# Patient Record
Sex: MALE | Race: WHITE | URBAN - METROPOLITAN AREA
[De-identification: names, ages, dates, MRNs, and addresses within clinical notes are randomized per-mention and may not be internally consistent; named-entity substitution may affect disease eponyms.]

---

## 2017-03-21 ENCOUNTER — ANESTHESIA EVENT (OUTPATIENT)
Dept: SURGERY | Age: 27
End: 2017-03-21
Payer: SELF-PAY

## 2017-03-21 NOTE — H&P
Surgery History and Physical    Subjective:      Marek Gonzalez is a 31 y.o. male who presents for bilateral excision gynecomastia and liposuction of chest.     Past Medical History:   Diagnosis Date    Anxiety     Depression      Past Surgical History:   Procedure Laterality Date    HX ORTHOPAEDIC        Family History   Problem Relation Age of Onset    Migraines      Thyroid Disease      Depression       Social History   Substance Use Topics    Smoking status: Former Smoker    Smokeless tobacco: Current User    Alcohol use Yes      Prior to Admission medications    Medication Sig Start Date End Date Taking? Authorizing Provider   TOPIRAMATE (TOPAMAX PO) Take  by mouth. Historical Provider   PARoxetine (PAXIL) 10 mg tablet Take  by mouth daily. Historical Provider      No Known Allergies    Review of Systems:  Cardiovascular: negative  Gastrointestinal: negative  Integument/Breast: negative    Objective:      No data found. Temp (24hrs), Av °F (-17.8 °C), Min:, Max:      Physical Exam:  GENERAL: alert, cooperative, no distress, appears stated age  LUNG: clear to auscultation bilaterally  HEART: regular rate and rhythm, S1, S2 normal, no murmur, click, rub or gallop  ABDOMEN: soft, non-tender. Bowel sounds normal. No masses,  no organomegaly  NEUROLOGIC: negative    Assessment: This is a 32 yo male who presents for bilateral excision gynecomastia and liposuction of chest.     Plan:     Discussed the risk of surgery including bleeding and infection,  and the risks of general anesthetic. The patient understands the risks; any and all questions were answered to the patient's satisfaction.     Signed By: Tank Lloyd PA-C     2017

## 2017-03-22 ENCOUNTER — HOSPITAL ENCOUNTER (OUTPATIENT)
Age: 27
Setting detail: OUTPATIENT SURGERY
Discharge: HOME | End: 2017-03-22
Attending: SURGERY | Admitting: SURGERY
Payer: SELF-PAY

## 2017-03-22 ENCOUNTER — ANESTHESIA (OUTPATIENT)
Dept: SURGERY | Age: 27
End: 2017-03-22
Payer: SELF-PAY

## 2017-03-22 VITALS
WEIGHT: 211.42 LBS | BODY MASS INDEX: 29.6 KG/M2 | SYSTOLIC BLOOD PRESSURE: 149 MMHG | HEIGHT: 71 IN | TEMPERATURE: 97.8 F | OXYGEN SATURATION: 97 % | RESPIRATION RATE: 18 BRPM | DIASTOLIC BLOOD PRESSURE: 78 MMHG | HEART RATE: 77 BPM

## 2017-03-22 PROCEDURE — 77030008684 HC TU ET CUF COVD -B: Performed by: ANESTHESIOLOGY

## 2017-03-22 PROCEDURE — 77030002986 HC SUT PROL J&J -A: Performed by: SURGERY

## 2017-03-22 PROCEDURE — 77030002916 HC SUT ETHLN J&J -A: Performed by: SURGERY

## 2017-03-22 PROCEDURE — 77030002933 HC SUT MCRYL J&J -A: Performed by: SURGERY

## 2017-03-22 PROCEDURE — 77030010507 HC ADH SKN DERMBND J&J -B: Performed by: SURGERY

## 2017-03-22 PROCEDURE — 74011000272 HC RX REV CODE- 272: Performed by: SURGERY

## 2017-03-22 PROCEDURE — 76060000034 HC ANESTHESIA 1.5 TO 2 HR: Performed by: SURGERY

## 2017-03-22 PROCEDURE — 77030008467 HC STPLR SKN COVD -B: Performed by: SURGERY

## 2017-03-22 PROCEDURE — 77030002966 HC SUT PDS J&J -A: Performed by: SURGERY

## 2017-03-22 PROCEDURE — 77030008534 HC TBNG LIPOSUC BYRO -B: Performed by: SURGERY

## 2017-03-22 PROCEDURE — 77030020255 HC SOL INJ LR 1000ML BG: Performed by: SURGERY

## 2017-03-22 PROCEDURE — 74011250636 HC RX REV CODE- 250/636: Performed by: SURGERY

## 2017-03-22 PROCEDURE — 74011000250 HC RX REV CODE- 250: Performed by: SURGERY

## 2017-03-22 PROCEDURE — 77030008538 HC TBNG LIPO SUC WELL -B: Performed by: SURGERY

## 2017-03-22 PROCEDURE — 77030020782 HC GWN BAIR PAWS FLX 3M -B

## 2017-03-22 PROCEDURE — 77030002996 HC SUT SLK J&J -A: Performed by: SURGERY

## 2017-03-22 PROCEDURE — 76010000153 HC OR TIME 1.5 TO 2 HR: Performed by: SURGERY

## 2017-03-22 PROCEDURE — 77030032490 HC SLV COMPR SCD KNE COVD -B: Performed by: SURGERY

## 2017-03-22 PROCEDURE — 74011250636 HC RX REV CODE- 250/636: Performed by: ANESTHESIOLOGY

## 2017-03-22 PROCEDURE — 77030019908 HC STETH ESOPH SIMS -A: Performed by: ANESTHESIOLOGY

## 2017-03-22 PROCEDURE — 77030011640 HC PAD GRND REM COVD -A: Performed by: SURGERY

## 2017-03-22 PROCEDURE — 76210000016 HC OR PH I REC 1 TO 1.5 HR: Performed by: SURGERY

## 2017-03-22 PROCEDURE — 77030026438 HC STYL ET INTUB CARD -A: Performed by: ANESTHESIOLOGY

## 2017-03-22 PROCEDURE — 77030013567 HC DRN WND RESERV BARD -A: Performed by: SURGERY

## 2017-03-22 PROCEDURE — 77030012406 HC DRN WND PENRS BARD -A: Performed by: SURGERY

## 2017-03-22 PROCEDURE — 77030018836 HC SOL IRR NACL ICUM -A: Performed by: SURGERY

## 2017-03-22 PROCEDURE — 77030031139 HC SUT VCRL2 J&J -A: Performed by: SURGERY

## 2017-03-22 PROCEDURE — 88305 TISSUE EXAM BY PATHOLOGIST: CPT | Performed by: SURGERY

## 2017-03-22 PROCEDURE — 74011250636 HC RX REV CODE- 250/636

## 2017-03-22 PROCEDURE — 77030028700 HC BLD TISS PLSM MEDT -E: Performed by: SURGERY

## 2017-03-22 PROCEDURE — 76210000021 HC REC RM PH II 0.5 TO 1 HR: Performed by: SURGERY

## 2017-03-22 PROCEDURE — 74011000250 HC RX REV CODE- 250

## 2017-03-22 PROCEDURE — 74011000258 HC RX REV CODE- 258: Performed by: SURGERY

## 2017-03-22 PROCEDURE — 77030011283 HC ELECTRD NDL COVD -A: Performed by: SURGERY

## 2017-03-22 PROCEDURE — 77030016570 HC BLNKT BAIR HGGR 3M -B: Performed by: SURGERY

## 2017-03-22 RX ORDER — PROPOFOL 10 MG/ML
INJECTION, EMULSION INTRAVENOUS AS NEEDED
Status: DISCONTINUED | OUTPATIENT
Start: 2017-03-22 | End: 2017-03-22 | Stop reason: HOSPADM

## 2017-03-22 RX ORDER — NALOXONE HYDROCHLORIDE 0.4 MG/ML
0.2 INJECTION, SOLUTION INTRAMUSCULAR; INTRAVENOUS; SUBCUTANEOUS
Status: DISCONTINUED | OUTPATIENT
Start: 2017-03-22 | End: 2017-03-22 | Stop reason: HOSPADM

## 2017-03-22 RX ORDER — SUCCINYLCHOLINE CHLORIDE 20 MG/ML
INJECTION INTRAMUSCULAR; INTRAVENOUS AS NEEDED
Status: DISCONTINUED | OUTPATIENT
Start: 2017-03-22 | End: 2017-03-22 | Stop reason: HOSPADM

## 2017-03-22 RX ORDER — DEXAMETHASONE SODIUM PHOSPHATE 4 MG/ML
INJECTION, SOLUTION INTRA-ARTICULAR; INTRALESIONAL; INTRAMUSCULAR; INTRAVENOUS; SOFT TISSUE AS NEEDED
Status: DISCONTINUED | OUTPATIENT
Start: 2017-03-22 | End: 2017-03-22 | Stop reason: HOSPADM

## 2017-03-22 RX ORDER — MIDAZOLAM HYDROCHLORIDE 1 MG/ML
2 INJECTION, SOLUTION INTRAMUSCULAR; INTRAVENOUS
Status: DISCONTINUED | OUTPATIENT
Start: 2017-03-22 | End: 2017-03-22 | Stop reason: HOSPADM

## 2017-03-22 RX ORDER — SODIUM CHLORIDE, SODIUM LACTATE, POTASSIUM CHLORIDE, CALCIUM CHLORIDE 600; 310; 30; 20 MG/100ML; MG/100ML; MG/100ML; MG/100ML
125 INJECTION, SOLUTION INTRAVENOUS CONTINUOUS
Status: DISCONTINUED | OUTPATIENT
Start: 2017-03-22 | End: 2017-03-22 | Stop reason: HOSPADM

## 2017-03-22 RX ORDER — LIDOCAINE HYDROCHLORIDE 10 MG/ML
0.1 INJECTION, SOLUTION EPIDURAL; INFILTRATION; INTRACAUDAL; PERINEURAL AS NEEDED
Status: DISCONTINUED | OUTPATIENT
Start: 2017-03-22 | End: 2017-03-22 | Stop reason: HOSPADM

## 2017-03-22 RX ORDER — MIDAZOLAM HYDROCHLORIDE 1 MG/ML
INJECTION, SOLUTION INTRAMUSCULAR; INTRAVENOUS AS NEEDED
Status: DISCONTINUED | OUTPATIENT
Start: 2017-03-22 | End: 2017-03-22 | Stop reason: HOSPADM

## 2017-03-22 RX ORDER — FLUMAZENIL 0.1 MG/ML
0.2 INJECTION INTRAVENOUS
Status: DISCONTINUED | OUTPATIENT
Start: 2017-03-22 | End: 2017-03-22 | Stop reason: HOSPADM

## 2017-03-22 RX ORDER — HYDROMORPHONE HYDROCHLORIDE 1 MG/ML
.25-1 INJECTION, SOLUTION INTRAMUSCULAR; INTRAVENOUS; SUBCUTANEOUS
Status: DISCONTINUED | OUTPATIENT
Start: 2017-03-22 | End: 2017-03-22 | Stop reason: HOSPADM

## 2017-03-22 RX ORDER — TESTOSTERONE GEL, 1% 10 MG/G
50 GEL TRANSDERMAL DAILY
COMMUNITY

## 2017-03-22 RX ORDER — CEFAZOLIN SODIUM IN 0.9 % NACL 2 G/50 ML
2 INTRAVENOUS SOLUTION, PIGGYBACK (ML) INTRAVENOUS ONCE
Status: DISCONTINUED | OUTPATIENT
Start: 2017-03-22 | End: 2017-03-22 | Stop reason: HOSPADM

## 2017-03-22 RX ORDER — ONDANSETRON 2 MG/ML
INJECTION INTRAMUSCULAR; INTRAVENOUS AS NEEDED
Status: DISCONTINUED | OUTPATIENT
Start: 2017-03-22 | End: 2017-03-22 | Stop reason: HOSPADM

## 2017-03-22 RX ORDER — LIDOCAINE HYDROCHLORIDE 20 MG/ML
INJECTION, SOLUTION EPIDURAL; INFILTRATION; INTRACAUDAL; PERINEURAL AS NEEDED
Status: DISCONTINUED | OUTPATIENT
Start: 2017-03-22 | End: 2017-03-22 | Stop reason: HOSPADM

## 2017-03-22 RX ORDER — ROCURONIUM BROMIDE 10 MG/ML
INJECTION, SOLUTION INTRAVENOUS AS NEEDED
Status: DISCONTINUED | OUTPATIENT
Start: 2017-03-22 | End: 2017-03-22 | Stop reason: HOSPADM

## 2017-03-22 RX ORDER — FENTANYL CITRATE 50 UG/ML
INJECTION, SOLUTION INTRAMUSCULAR; INTRAVENOUS AS NEEDED
Status: DISCONTINUED | OUTPATIENT
Start: 2017-03-22 | End: 2017-03-22 | Stop reason: HOSPADM

## 2017-03-22 RX ORDER — DIPHENHYDRAMINE HYDROCHLORIDE 50 MG/ML
12.5 INJECTION, SOLUTION INTRAMUSCULAR; INTRAVENOUS AS NEEDED
Status: DISCONTINUED | OUTPATIENT
Start: 2017-03-22 | End: 2017-03-22 | Stop reason: HOSPADM

## 2017-03-22 RX ORDER — CEFAZOLIN SODIUM 1 G/3ML
INJECTION, POWDER, FOR SOLUTION INTRAMUSCULAR; INTRAVENOUS AS NEEDED
Status: DISCONTINUED | OUTPATIENT
Start: 2017-03-22 | End: 2017-03-22 | Stop reason: HOSPADM

## 2017-03-22 RX ADMIN — HYDROMORPHONE HYDROCHLORIDE 0.5 MG: 1 INJECTION, SOLUTION INTRAMUSCULAR; INTRAVENOUS; SUBCUTANEOUS at 13:02

## 2017-03-22 RX ADMIN — SUCCINYLCHOLINE CHLORIDE 100 MG: 20 INJECTION INTRAMUSCULAR; INTRAVENOUS at 11:00

## 2017-03-22 RX ADMIN — HYDROMORPHONE HYDROCHLORIDE 1 MG: 1 INJECTION, SOLUTION INTRAMUSCULAR; INTRAVENOUS; SUBCUTANEOUS at 13:20

## 2017-03-22 RX ADMIN — DEXAMETHASONE SODIUM PHOSPHATE 8 MG: 4 INJECTION, SOLUTION INTRA-ARTICULAR; INTRALESIONAL; INTRAMUSCULAR; INTRAVENOUS; SOFT TISSUE at 10:59

## 2017-03-22 RX ADMIN — LIDOCAINE HYDROCHLORIDE 40 MG: 20 INJECTION, SOLUTION EPIDURAL; INFILTRATION; INTRACAUDAL; PERINEURAL at 10:59

## 2017-03-22 RX ADMIN — FENTANYL CITRATE 50 MCG: 50 INJECTION, SOLUTION INTRAMUSCULAR; INTRAVENOUS at 12:08

## 2017-03-22 RX ADMIN — SODIUM CHLORIDE, POTASSIUM CHLORIDE, SODIUM LACTATE AND CALCIUM CHLORIDE: 600; 310; 30; 20 INJECTION, SOLUTION INTRAVENOUS at 10:12

## 2017-03-22 RX ADMIN — FENTANYL CITRATE 200 MCG: 50 INJECTION, SOLUTION INTRAMUSCULAR; INTRAVENOUS at 10:59

## 2017-03-22 RX ADMIN — SODIUM CHLORIDE, POTASSIUM CHLORIDE, SODIUM LACTATE AND CALCIUM CHLORIDE: 600; 310; 30; 20 INJECTION, SOLUTION INTRAVENOUS at 12:00

## 2017-03-22 RX ADMIN — HYDROMORPHONE HYDROCHLORIDE 1 MG: 1 INJECTION, SOLUTION INTRAMUSCULAR; INTRAVENOUS; SUBCUTANEOUS at 12:54

## 2017-03-22 RX ADMIN — MIDAZOLAM HYDROCHLORIDE 3 MG: 1 INJECTION, SOLUTION INTRAMUSCULAR; INTRAVENOUS at 10:52

## 2017-03-22 RX ADMIN — PROPOFOL 200 MG: 10 INJECTION, EMULSION INTRAVENOUS at 10:59

## 2017-03-22 RX ADMIN — ROCURONIUM BROMIDE 10 MG: 10 INJECTION, SOLUTION INTRAVENOUS at 10:59

## 2017-03-22 RX ADMIN — FENTANYL CITRATE 50 MCG: 50 INJECTION, SOLUTION INTRAMUSCULAR; INTRAVENOUS at 12:42

## 2017-03-22 RX ADMIN — CEFAZOLIN SODIUM 2 G: 1 INJECTION, POWDER, FOR SOLUTION INTRAMUSCULAR; INTRAVENOUS at 10:59

## 2017-03-22 RX ADMIN — FENTANYL CITRATE 50 MCG: 50 INJECTION, SOLUTION INTRAMUSCULAR; INTRAVENOUS at 10:53

## 2017-03-22 RX ADMIN — ONDANSETRON 4 MG: 2 INJECTION INTRAMUSCULAR; INTRAVENOUS at 10:59

## 2017-03-22 RX ADMIN — MIDAZOLAM HYDROCHLORIDE 2 MG: 1 INJECTION, SOLUTION INTRAMUSCULAR; INTRAVENOUS at 10:59

## 2017-03-22 RX ADMIN — ROCURONIUM BROMIDE 30 MG: 10 INJECTION, SOLUTION INTRAVENOUS at 11:04

## 2017-03-22 NOTE — IP AVS SNAPSHOT
[address] [place] [address] 
[phone] Patient: Enid Case MRN: [id number] TOO:[id number] You are allergic to the following No active allergies Recent Documentation Height Weight BMI Smoking Status 1.803 m 95.9 kg 29.49 kg/m2 Former Smoker Emergency Contacts Name Discharge Info Relation Home Work Mobile Maico Tirado  Spouse [3] [phone] About your hospitalization You were admitted on:  March 22, 2017 You last received care in the:  [facility] PACU You were discharged on:  March 22, 2017 Unit phone number:  [phone] Why you were hospitalized Your primary diagnosis was:  Not on File Providers Seen During Your Hospitalizations Provider Role Specialty Primary office phone Julee Maria MD Attending Provider Plastic Surgery [phone] Your Primary Care Physician (PCP) Primary Care Physician Office Phone [facility]ax [facility] [phone] Follow-up Information Follow up With Details Comments Contact Info DO Janell Thomas [address] [place] [id number] 
[phone] Current Discharge Medication List  
  
ASK your doctor about these medications Dose & Instructions Dispensing Information Comments Morning Noon Evening Bedtime OTHER Your last dose was: Your next dose is:    
   
   
  Refills:  0  
     
   
   
   
  
 testosterone 1 % (50 mg/5 gram) Glpk Commonly known as:  ANDROGEL Your last dose was: Your next dose is:    
   
   
 Dose:  50 mg  
50 mg by TransDERmal route daily. Refills:  0 Discharge Instructions Gynecomastia Patient Instructions Please come to the office on Friday at 10 am for a follow up visit with Dr. Deven River before the weekend. Immediately Following Surgery: After surgery you will rest quietly for the first 48 hours. You will be able to walk around the house and perform light daily activities; however, during this time, it is not at all unusual for you to feel some pain, soreness and pressure in the chest area. This will gradually subside and Dr. Enriquez Friend will give you pain medication to relieve it. You must take the entire prescription of antibiotics. Be sure to lie on your back whenever you rest or sleep. Keep your head elevated for 72 hours after surgery as this will help with swelling. The surgery garment that you have been put in should be worn constantly during the day and at night. . 
 
You will then be allowed to shower two days after surgery. Fiona Sake yourself everywhere, including the tapes and your incisions. Use mild soap and pat yourself dry and put the bra back on. This daily routine will help keep the incisions clean, and will promote wound healing. Do not submerge yourself in a bath, swimming pool, or whirlpool for two weeks. You will wear the garment for a total of two to three weeks after surgery. The small tape strips covering your incisions. These will remain in place for seven days and will peel off by themselves. A few patients react to the anesthetic after surgery with nausea and vomiting. This usually lasts less than 24 hours and should be treated with lots of fluids. Dr. Enriquez Friend will prescribe nausea medicine for during your preoperative visit. The maximum swelling occurs at about three days and then begins to dramatically improve. Mild bruising typically resolves within 14 days. You should plan to be off work for 1-2 weeks, although this can vary from person to person. Additional Post-Operative Instructions: No heavy exercise or lifting for three weeks following surgery.  For the first three days following surgery you should try to restrict your arm movements. Move your arms slowly and avoid sudden jerky movements of the chest area. Keep your arms as close to your body as possible. Dr. Maty Leon encourages walking immediately after surgery. This activity will greatly minimize the risk of blood clots in your leg veins. Do not expose your breasts to the sun for eight weeks after surgery. Please notify Dr. Maty Leon if: 
? If your one breast becomes significantly larger than the other ? If you develop significant bruising across the chest  
? If you experience a significant increase in pain in the breast after the pain has improved ? If you develop a temperature above 101.5° F 
? If you develop redness (like a sunburn) around your incisions If you need help or have any questions feel free to call Dr Maty Leon with your concerns. Dr. Maty Leon is on call 24 hours per day, seven days per week and has an answering service to forward calls. The quality of your cosmetic enhancement may be compromised if you fail to return for any scheduled post-op visits, or follow the pre- and post-operative instructions. Please dont hesitate to report any unusual or concerning changes. Our number is [phone]. DISCHARGE SUMMARY from your Nurse The following personal items collected during your admission are returned to you:  
Dental Appliance: Dental Appliances: None Vision:   
Hearing Aid:   
Jewelry: Jewelry: None Clothing: Clothing: Other (comment) (street clothes) Other Valuables: Other Valuables: None Valuables sent to safe: Personal Items Sent to Safe: none PATIENT INSTRUCTIONS: 
 
After general anesthesia or intravenous sedation, for 24 hours or while taking prescription Narcotics: · Limit your activities · Do not drive and operate hazardous machinery · Do not make important personal or business decisions · Do  not drink alcoholic beverages · If you have not urinated within 8 hours after discharge, please contact your surgeon on call. Report the following to your surgeon: 
· Excessive pain, swelling, redness or odor of or around the surgical area · Temperature over 100.5 · Nausea and vomiting lasting longer than 4 hours or if unable to take medications · Any signs of decreased circulation or nerve impairment to extremity: change in color, persistent  numbness, tingling, coldness or increase pain · Any questions [address] Breathing deep and coughing are very important exercises to do after surgery. Deep breathing and coughing open the little air tubes and air sacks in your lungs. You take deep breaths every day. You may not even notice - it is just something you do when you sigh or yawn. It is a natural exercise you do to keep these air passages open. After surgery, take deep breaths and cough, on purpose. Coughing and deep breathing help prevent bronchitis and pneumonia after surgery. If you had chest or belly surgery, use a pillow as a \"hug connor\" and hold it tightly to your chest or belly when you cough. DIRECTIONS: 
6. Take 10 to 15 slow deep breaths every hour while awake. 7. Breathe in deeply, and hold it for 2 seconds. 8. Exhale slowly through puckered lips, like blowing up a balloon. 9. After every 4th or 5th deep breath, hug your pillow to your chest or belly and give a hard, deep cough. Yes, it will probably hurt. But doing this exercise is very important part of healing after surgery. Take your pain medicine to help you do this exercise without too much pain. IF YOU HAVE BEEN DIAGNOSED WITH SLEEP APNEA, PLEASE USE YOUR SLEEIFP APNEA DEVICE OR CPAP MACHINE WHEN YOU INTEND TO NAP AFTER TAKING PAIN MEDICATION. Ankle Pumps Ankle pumps increase the circulation of oxygenated blood to your lower extremities and decrease your risk for circulation problems such as blood clots.  They also stretch the muscles, tendons and ligaments in your foot and ankle, and prevent joint contracture in the ankle and foot, especially after surgeries on the legs. It is important to do ankle pump exercises regularly after surgery because immobility increases your risk for developing a blood clot. Your doctor may also have you take an Aspirin for the next few days as well. If your doctor did not ask you to take an Aspirin, consult with him before starting Aspirin therapy on your own. Slowly point your foot forward, feeling the muscles on the top of your lower leg stretch, and hold this position for 5 seconds. Next, pull your foot back toward you as far as possible, stretching the calf muscles, and hold that position for 5 seconds. Repeat with the other foot. Perform 10 repetitions every hour while awake for both ankles if possible (down and then up with the foot once is one repetition). You should feel gentle stretching of the muscles in your lower leg when doing this exercise. If you feel pain, or your range of motion is limited, don't  Push too hard. Only go the limit your joint and muscles will let you go. If you have increasing pain, progressively worsening leg warmth or swelling, STOP the exercise and call your doctor. Below is information about the medications your doctor is prescribing after your visit: 
 
Other information in your discharge envelope: PRESCRIPTIONS PHYSICAL THERAPY PRESCRIPTION 
     APPOINTMENT CARDS [facility] Pamphlet for block or block with On-Q Catheter from Anesthesia Service Medical device information sheets/pamphlets from their  School/work excuse note. /parent work excuse note. These are general instructions for a healthy lifestyle: *  Please give a list of your current medications to your Primary Care Provider.  
*  Please update this list whenever your medications are discontinued, doses are 
 changed, or new medications (including over-the-counter products) are added. *  Please carry medication information at all times in case of emergency situations. About Smoking No smoking / No tobacco products / Avoid exposure to second hand smoke Surgeon General's Warning:  Quitting smoking now greatly reduces serious risk to your health. Obesity, smoking, and sedentary lifestyle greatly increases your risk for illness and disease. A healthy diet, regular physical exercise & weight monitoring are important for maintaining a healthy lifestyle. Congestive Heart Failure You may be retaining fluid if you have a history of heart failure or if you experience any of the following symptoms:  Weight gain of 3 pounds or more overnight or 5 pounds in a week, increased swelling in our hands or feet or shortness of breath while lying flat in bed. Please call your doctor as soon as you notice any of these symptoms; do not wait until your next office visit. Recognize signs and symptoms of STROKE: 
F - face looks uneven A - arms unable to move or move even S - speech slurred or non-existent T - time-call 911 as soon as signs and symptoms begin-DO NOT go Back to bed or wait to see if you get better-TIME IS BRAIN. Warning signs of HEART ATTACK Call 911 if you have these symptoms · Chest discomfort. Most heart attacks involve discomfort in the center of the chest that lasts more than a few minutes, or that goes away and comes back. It can feel like uncomfortable pressure, squeezing, fullness, or pain. · Discomfort in other areas of the upper body. Symptoms can include pain or discomfort in one or both Arms, the back, neck, jaw, or stomach. ·  Shortness of breath with or without chest discomfort. · Other signs may include breaking out in a cold sweat, nausea, or lightheadedness Don't wait more than five minutes to call 911 - MINUTES MATTER!  Fast action can save your life. Calling 911 is almost always the fastest way to get lifesaving treatment. Emergency Medical Services staff can begin treatment when they arrive - up to an hour sooner than if someone gets to the hospital by car. How to Care for A Wound With Skin Glue Topical skin glue is a sterile, liquid skin adhesive that holds wound edges together. The film will usually last 7-10 days, then begin to loosen from your skin. The glue may go by different names, depending on the maker of the product. The following may answer some of your questions and provide wound care instructions: CHECK THE WOUND APPEARANCE · Swelling, redness, and pain are common with all wounds - these normally go away as the wound heals · If swelling, redness, or pain increases, or if the wound feels warm to the touch call your doctor · Contact your doctor if the wound edges reopen or separate REPLACE BANDAGES 
· If your wound is bandaged, keep the bandage dry · Replace the bandage daily until the glue has fallen off, or if the bandage should become wet, unless otherwise instructed by your doctor · When changing the dressing, do not place tape directly over the glue -  removing the tape later may also remove the glue before the wound has had time to heal 
 
AVOIDING TOPICAL MEDICATIONS · Do not apply any liquid, ointment medication, or any other product to your wound while the glue is in place - these may loosen the glue before your wound is healed KEEP WOUND DRY AND PROTECTED · You may briefly wet your wound in the shower if permitted by your surgeon. · Do not soak/immerse your wound; do not swim; avoid periods of heavy perspiration until the glue has naturally fallen off · After showering, gently blot your wound dry with a soft towel. If a protective dressing is being used, apply a fresh, dry bandage · Apply a clean, dry bandage over the wound if necessary to protect it · Protect your wound from injury until the skin has had sufficient time to heal 
· Do not scratch, rub, scrub, or pick at the glue - these may loosen the glue before your wound is healed · Protect the wound from prolonged exposure to sunlight or tanning lamps while the glue is in place If you have any questions or concerns about this product, please consult your doctor. \ [facility] ([facility]) DRAIN CARE [facility] 1. Strip the tubing 3 times a day (more often if there are a lot of blood clots). a. Wash hands. b. Grasp the tubing close to the exit point/dressing with one hand and stabilize it. 
c. With your other hand grasp the tubing next to your stabilizing hand. d. Using an alcohol pad (or lotion), pinching the tubing tightly, slide your fingers down the tubing away from the body to expel contents of the tubing into the bulb; repeat this 2 or 3 times. It is okay if the tubing becomes flat from the suction. 2. Empty the bulb into a small measuring container 3 times a day or whenever the bulb is full or the bulb feels heavy. a. Wash Hands. b. Open the small plug on the top of the bulb and pour the drainage into the measuring container. c. Squeeze the bulb and hold while replacing the plug. The bulb must be collapsed for the suction and drain to work. 
d. Leonard Watson can pin the tag of the bulb to your clothing so the weight of the bulb does not pull on the insertion site. 3. Measure the drainage and record the amount each time that you empty it. 
a. Hold the measuring container at eye level to read the numbers on the side. b. Read the numbers in the ml column and record the time and amount. c. Wash hands. To empty and measure                         To prime and resume suction Showering/Dressing Change 1. You *** MAY / MAY NOT shower. 2. You *** MAY / MAY NOT change the dressing. (If you are allowed to change the dressing and/or shower) 3. Apply a clean 2x2 or 4x4 gauze around the insertion site after the shower. You may need to change it more often if it becomes heavily soiled. Call if there is more than a 1 inch area of drainage on your dressing. 4. Call Dr. Skylar Hawkins with the drainage amount on ***. Use the table below to keep a record. DATE TIME DRAINAGE AMOUNT Discharge Orders None Introducing [facility]! [facility] introduces [employer] patient portal. Now you can access parts of your medical record, email your doctor's office, and request medication refills online. 1. In your internet browser, go to [email or web address] 2. Click on the First Time User? Click Here link in the Sign In box. You will see the New Member Sign Up page. 3. Enter your [employer] Access Code exactly as it appears below. You will not need to use this code after youve completed the sign-up process. If you do not sign up before the expiration date, you must request a new code. · [employer] Access Code: [email or web address] Expires: 6/18/2017  3:36 PM 
 
4. Enter the last four digits of your Social Security Number (xxxx) and Date of Birth (mm/dd/yyyy) as indicated and click Submit. You will be taken to the next sign-up page. 5. Create a [employer] ID. This will be your [employer] login ID and cannot be changed, so think of one that is secure and easy to remember. 6. Create a [employer] password. You can change your password at any time. 7. Enter your Password Reset Question and Answer. This can be used at a later time if you forget your password. 8. Enter your e-mail address. You will receive e-mail notification when new information is available in [address] 9. Click Sign Up. You can now view and download portions of your medical record. 10. Click the Download Summary menu link to download a portable copy of your medical information. If you have questions, please visit the Frequently Asked Questions section of the MyChart website. Remember, MyChart is NOT to be used for urgent needs. For medical emergencies, dial 911. Now available from your iPhone and Android! General Information Please provide this summary of care documentation to your next provider. Patient Signature:  ____________________________________________________________ Date:  ____________________________________________________________  
  
Yash Sport Provider Signature:  ____________________________________________________________ Date:  ____________________________________________________________

## 2017-03-22 NOTE — DISCHARGE INSTRUCTIONS
Gynecomastia Patient Instructions    Please come to the office on Friday at 10 am for a follow up visit with Dr. Maty Leon before the weekend. Immediately Following Surgery:    After surgery you will rest quietly for the first 48 hours. You will be able to walk around the house and perform light daily activities; however, during this time, it is not at all unusual for you to feel some pain, soreness and pressure in the chest area. This will gradually subside and Dr. Maty Leon will give you pain medication to relieve it. You must take the entire prescription of antibiotics. Be sure to lie on your back whenever you rest or sleep. Keep your head elevated for 72 hours after surgery as this will help with swelling. The surgery garment that you have been put in should be worn constantly during the day and at night. .    You will then be allowed to shower two days after surgery. Viona Bearded yourself everywhere, including the tapes and your incisions. Use mild soap and pat yourself dry and put the bra back on. This daily routine will help keep the incisions clean, and will promote wound healing. Do not submerge yourself in a bath, swimming pool, or whirlpool for two weeks. You will wear the garment for a total of two to three weeks after surgery. The small tape strips covering your incisions. These will remain in place for seven days and will peel off by themselves. A few patients react to the anesthetic after surgery with nausea and vomiting. This usually lasts less than 24 hours and should be treated with lots of fluids. Dr. Maty Leon will prescribe nausea medicine for during your preoperative visit. The maximum swelling occurs at about three days and then begins to dramatically improve. Mild bruising typically resolves within 14 days. You should plan to be off work for 1-2 weeks, although this can vary from person to person.     Additional Post-Operative Instructions:    No heavy exercise or lifting for three weeks following surgery. For the first three days following surgery you should try to restrict your arm movements. Move your arms slowly and avoid sudden jerky movements of the chest area. Keep your arms as close to your body as possible. Dr. Nia Mckeon encourages walking immediately after surgery. This activity will greatly minimize the risk of blood clots in your leg veins. Do not expose your breasts to the sun for eight weeks after surgery. Please notify Dr. Nia Mckeon if:   If your one breast becomes significantly larger than the other   If you develop significant bruising across the chest    If you experience a significant increase in pain in the breast after the pain has improved   If you develop a temperature above 101.5° F   If you develop redness (like a sunburn) around your incisions  If you need help or have any questions feel free to call Dr Nia Mckeon with your concerns. Dr. Nia Mckeon is on call 24 hours per day, seven days per week and has an answering service to forward calls. The quality of your cosmetic enhancement may be compromised if you fail to return for any scheduled post-op visits, or follow the pre- and post-operative instructions. Please dont hesitate to report any unusual or concerning changes. Our number is [phone]. DISCHARGE SUMMARY from your Nurse    The following personal items collected during your admission are returned to you:   Dental Appliance: Dental Appliances: None  Vision:    Hearing Aid:    Jewelry: Jewelry: None  Clothing: Clothing: Other (comment) (street clothes)  Other Valuables:  Other Valuables: None  Valuables sent to safe: Personal Items Sent to Safe: none    PATIENT INSTRUCTIONS:    After general anesthesia or intravenous sedation, for 24 hours or while taking prescription Narcotics:  · Limit your activities  · Do not drive and operate hazardous machinery  · Do not make important personal or business decisions  · Do  not drink alcoholic beverages  · If you have not urinated within 8 hours after discharge, please contact your surgeon on call. Report the following to your surgeon:  · Excessive pain, swelling, redness or odor of or around the surgical area  · Temperature over 100.5  · Nausea and vomiting lasting longer than 4 hours or if unable to take medications  · Any signs of decreased circulation or nerve impairment to extremity: change in color, persistent  numbness, tingling, coldness or increase pain  · Any questions    COUGH AND DEEP BREATHE    Breathing deep and coughing are very important exercises to do after surgery. Deep breathing and coughing open the little air tubes and air sacks in your lungs. You take deep breaths every day. You may not even notice - it is just something you do when you sigh or yawn. It is a natural exercise you do to keep these air passages open. After surgery, take deep breaths and cough, on purpose. Coughing and deep breathing help prevent bronchitis and pneumonia after surgery. If you had chest or belly surgery, use a pillow as a \"hug buddy\" and hold it tightly to your chest or belly when you cough. DIRECTIONS:  6. Take 10 to 15 slow deep breaths every hour while awake. 7. Breathe in deeply, and hold it for 2 seconds. 8. Exhale slowly through puckered lips, like blowing up a balloon. 9. After every 4th or 5th deep breath, hug your pillow to your chest or belly and give a hard, deep cough. Yes, it will probably hurt. But doing this exercise is very important part of healing after surgery. Take your pain medicine to help you do this exercise without too much pain. IF YOU HAVE BEEN DIAGNOSED WITH SLEEP APNEA, PLEASE USE YOUR SLEEIFP APNEA DEVICE OR CPAP MACHINE WHEN YOU INTEND TO NAP AFTER TAKING PAIN MEDICATION.     Ankle Pumps    Ankle pumps increase the circulation of oxygenated blood to your lower extremities and decrease your risk for circulation problems such as blood clots. They also stretch the muscles, tendons and ligaments in your foot and ankle, and prevent joint contracture in the ankle and foot, especially after surgeries on the legs. It is important to do ankle pump exercises regularly after surgery because immobility increases your risk for developing a blood clot. Your doctor may also have you take an Aspirin for the next few days as well. If your doctor did not ask you to take an Aspirin, consult with him before starting Aspirin therapy on your own. Slowly point your foot forward, feeling the muscles on the top of your lower leg stretch, and hold this position for 5 seconds. Next, pull your foot back toward you as far as possible, stretching the calf muscles, and hold that position for 5 seconds. Repeat with the other foot. Perform 10 repetitions every hour while awake for both ankles if possible (down and then up with the foot once is one repetition). You should feel gentle stretching of the muscles in your lower leg when doing this exercise. If you feel pain, or your range of motion is limited, don't  Push too hard. Only go the limit your joint and muscles will let you go. If you have increasing pain, progressively worsening leg warmth or swelling, STOP the exercise and call your doctor. Below is information about the medications your doctor is prescribing after your visit:    Other information in your discharge envelope:  []     PRESCRIPTIONS  []     PHYSICAL THERAPY PRESCRIPTION  []     APPOINTMENT CARDS  []     Regional Anesthesia Pamphlet for block or block with On-Q Catheter from Anesthesia Service  []     Medical device information sheets/pamphlets from their    []     School/work excuse note. []     /parent work excuse note. These are general instructions for a healthy lifestyle:    *  Please give a list of your current medications to your Primary Care Provider.   *  Please update this list whenever your medications are discontinued, doses are      changed, or new medications (including over-the-counter products) are added. *  Please carry medication information at all times in case of emergency situations. About Smoking  No smoking / No tobacco products / Avoid exposure to second hand smoke    Surgeon General's Warning:  Quitting smoking now greatly reduces serious risk to your health. Obesity, smoking, and sedentary lifestyle greatly increases your risk for illness and disease. A healthy diet, regular physical exercise & weight monitoring are important for maintaining a healthy lifestyle. Congestive Heart Failure  You may be retaining fluid if you have a history of heart failure or if you experience any of the following symptoms:  Weight gain of 3 pounds or more overnight or 5 pounds in a week, increased swelling in our hands or feet or shortness of breath while lying flat in bed. Please call your doctor as soon as you notice any of these symptoms; do not wait until your next office visit. Recognize signs and symptoms of STROKE:  F - face looks uneven  A - arms unable to move or move even  S - speech slurred or non-existent  T - time-call 911 as soon as signs and symptoms begin-DO NOT go         Back to bed or wait to see if you get better-TIME IS BRAIN. Warning signs of HEART ATTACK  Call 911 if you have these symptoms    · Chest discomfort. Most heart attacks involve discomfort in the center of the chest that lasts more than a few minutes, or that goes away and comes back. It can feel like uncomfortable pressure, squeezing, fullness, or pain. · Discomfort in other areas of the upper body. Symptoms can include pain or discomfort in one or both        Arms, the back, neck, jaw, or stomach. ·  Shortness of breath with or without chest discomfort.   · Other signs may include breaking out in a cold sweat, nausea, or lightheadedness    Don't wait more than five minutes to call 911 - MINUTES MATTER! Fast action can save your life. Calling 911 is almost always the fastest way to get lifesaving treatment. Emergency Medical Services staff can begin treatment when they arrive - up to an hour sooner than if someone gets to the hospital by car. How to Care for A Wound With Skin Glue    Topical skin glue is a sterile, liquid skin adhesive that holds wound edges together. The film will usually last 7-10 days, then begin to loosen from your skin. The glue may go by different names, depending on the maker of the product. The following may answer some of your questions and provide wound care instructions:    [address]  · Swelling, redness, and pain are common with all wounds - these normally go away as the wound heals    · If swelling, redness, or pain increases, or if the wound feels warm to the touch call your doctor   · Contact your doctor if the wound edges reopen or separate    REPLACE BANDAGES  · If your wound is bandaged, keep the bandage dry  · Replace the bandage daily until the glue has fallen off, or if the bandage should become wet, unless otherwise instructed by your doctor  · When changing the dressing, do not place tape directly over the glue -  removing the tape later may also remove the glue before the wound has had time to heal    AVOIDING TOPICAL MEDICATIONS  · Do not apply any liquid, ointment medication, or any other product to your wound while the glue is in place - these may loosen the glue before your wound is healed    KEEP WOUND DRY AND PROTECTED  · You may briefly wet your wound in the shower if permitted by your surgeon. · Do not soak/immerse your wound; do not swim; avoid periods of heavy perspiration until the glue has naturally fallen off   · After showering, gently blot your wound dry with a soft towel.  If a protective dressing is being used, apply a fresh, dry bandage  · Apply a clean, dry bandage over the wound if necessary to protect it  · Protect your wound from injury until the skin has had sufficient time to heal  · Do not scratch, rub, scrub, or pick at the glue - these may loosen the glue before your wound is healed  · Protect the wound from prolonged exposure to sunlight or tanning lamps while the glue is in place     If you have any questions or concerns about this product, please consult your doctor. \           RUTHIEFOURNIER (J-P) DRAIN CARE         Care of the Drain    1. Strip the tubing 3 times a day (more often if there are a lot of blood clots). a. Wash hands. b. Grasp the tubing close to the exit point/dressing with one hand and stabilize it.  c. With your other hand grasp the tubing next to your stabilizing hand. d. Using an alcohol pad (or lotion), pinching the tubing tightly, slide your fingers down the tubing away from the body to expel contents of the tubing into the bulb; repeat this 2 or 3 times. It is okay if the tubing becomes flat from the suction. 2. Empty the bulb into a small measuring container 3 times a day or whenever the bulb is full or the bulb feels heavy. a. Wash Hands. b. Open the small plug on the top of the bulb and pour the drainage into the measuring container. c. Squeeze the bulb and hold while replacing the plug. The bulb must be collapsed for the suction and drain to work.  d. Cristopher Taveras can pin the tag of the bulb to your clothing so the weight of the bulb does not pull on the insertion site. 3. Measure the drainage and record the amount each time that you empty it.  a. Hold the measuring container at eye level to read the numbers on the side. b. Read the numbers in the ml column and record the time and amount. c. Wash hands. To empty and measure                         To prime and resume suction        Showering/Dressing Change      (If you are allowed to change the dressing and/or shower)  1.  Apply a clean 2x2 or 4x4 gauze around the insertion site after the shower. You may need to change it more often if it becomes heavily soiled. Call if there is more than a 1 inch area of drainage on your dressing.       DATE TIME DRAINAGE AMOUNT

## 2017-03-22 NOTE — IP AVS SNAPSHOT
Current Discharge Medication List  
  
ASK your doctor about these medications Dose & Instructions Dispensing Information Comments Morning Noon Evening Bedtime OTHER Your last dose was: Your next dose is:    
   
   
  Refills:  0  
     
   
   
   
  
 testosterone 1 % (50 mg/5 gram) Glpk Commonly known as:  ANDROGEL Your last dose was: Your next dose is:    
   
   
 Dose:  50 mg  
50 mg by TransDERmal route daily. Refills:  0

## 2017-03-22 NOTE — ANESTHESIA PREPROCEDURE EVALUATION
Anesthetic History   No history of anesthetic complications            Review of Systems / Medical History  Patient summary reviewed, nursing notes reviewed and pertinent labs reviewed    Pulmonary          Smoker ( reccently quit, no smokeless tobacco)         Neuro/Psych         Psychiatric history     Cardiovascular  Within defined limits                Exercise tolerance: >4 METS  Comments: Not on beta blocker   GI/Hepatic/Renal  Within defined limits              Endo/Other  Within defined limits           Other Findings            Physical Exam    Airway  Mallampati: II  TM Distance: 4 - 6 cm  Neck ROM: normal range of motion   Mouth opening: Normal     Cardiovascular  Regular rate and rhythm,  S1 and S2 normal,  no murmur, click, rub, or gallop  Rhythm: regular  Rate: normal         Dental    Dentition: Implants  Comments: Top front implants   Pulmonary  Breath sounds clear to auscultation               Abdominal  GI exam deferred       Other Findings            Anesthetic Plan    ASA: 2  Anesthesia type: general          Induction: Intravenous  Anesthetic plan and risks discussed with: Patient

## 2017-03-22 NOTE — BRIEF OP NOTE
BRIEF OPERATIVE NOTE    Date of Procedure: 3/22/2017   Preoperative Diagnosis: COSMETIC  Postoperative Diagnosis: COSMETIC    Procedure(s):  BILATERAL EXCISION OF GYNECOMASTIA WITH LIPOSUCTION CHEST WALL  LIPOSUCTION TRUNK  Surgeon(s) and Role:     * Alexys Bajwa MD - Primary       Physician Assistant: Fran Jordan PA-C    Surgical Staff:  Circ-1: Patrice Robles RN  Circ-Relief: Lio Thao RN  Physician Assistant: Fran Jordan PA-C  Scrub Tech-1: Roel Spatz  Scrub Tech-Relief: Michael Vargas  Event Time In   Incision Start 1129   Incision Close      Anesthesia: General   Estimated Blood Loss: 25  Specimens:   ID Type Source Tests Collected by Time Destination   1 : excised gynocomastia right chest Preservative Breast  Alexys Bajwa MD 3/22/2017 1218 Pathology   2 : excised gynocomastia left chest Preservative Breast  Alexys Bajwa MD 3/22/2017 1218 Pathology      Findings: 0   Complications: 0  Implants: * No implants in log *

## 2017-03-22 NOTE — INTERVAL H&P NOTE
H&P Update:  Angel Kelly was seen and examined. History and physical has been reviewed. The patient has been examined.  There have been no significant clinical changes since the completion of the originally dated History and Physical.    Signed By: Marlene Mike MD     March 22, 2017 10:03 AM

## 2017-03-22 NOTE — ANESTHESIA POSTPROCEDURE EVALUATION
Post-Anesthesia Evaluation and Assessment    Patient: Pham Rizzo MRN: [id number]  SSN: [id number]    YOB: 1990  Age: 31 y.o. Sex: male       Cardiovascular Function/Vital Signs  Visit Vitals    /86    Pulse 91    Temp 36.6 °C (97.8 °F)    Resp 16    Ht 5' 11\" (1.803 m)    Wt 95.9 kg (211 lb 6.7 oz)    SpO2 93%    BMI 29.49 kg/m2       Patient is status post general anesthesia for Procedure(s):  BILATERAL EXCISION OF GYNECOMASTIA WITH LIPOSUCTION CHEST WALL  LIPOSUCTION TRUNK. Nausea/Vomiting: None    Postoperative hydration reviewed and adequate. Pain:  Pain Scale 1: Numeric (0 - 10) (03/22/17 1303)  Pain Intensity 1: 8 (03/22/17 1303)   Managed    Neurological Status:   Neuro (WDL): Exceptions to WDL (03/22/17 1248)  Neuro  LUE Motor Response: Spontaneous  (03/22/17 1248)  LLE Motor Response: Spontaneous  (03/22/17 1248)  RUE Motor Response: Spontaneous  (03/22/17 1248)  RLE Motor Response: Spontaneous  (03/22/17 1248)   At baseline    Mental Status and Level of Consciousness: Arousable    Pulmonary Status:   O2 Device: Room air (03/22/17 1259)   Adequate oxygenation and airway patent    Complications related to anesthesia: None    Post-anesthesia assessment completed.  No concerns    Signed By: Waqar Vega MD     March 22, 2017

## 2017-03-22 NOTE — OP NOTES
[facility]   [address] [facility], [address]   OP NOTE       Name:  Dmitry Dodge   MR#:  [id number]   :  1990   Account #:  [de-identified]    Surgery Date:  2017   Date of Adm:  2017       PROCEDURES PERFORMED:   1. Tumescent liposuction bilateral chest wall. 2. Excision gynecomastia bilateral chest wall. PREOPERATIVE DIAGNOSES:   1. Dermatolipodystrophy chest wall. 2. Bilateral gynecomastia. POSTOPERATIVE DIAGNOSES:   1. Dermatolipodystrophy chest wall. 2. Bilateral gynecomastia. SURGEON: Analy Segura MD.    ASSISTANT: Lindwood Ormond, PA-C. ANESTHESIA: General endotracheal.    ESTIMATED BLOOD LOSS: 25 mL. DRAINS: A 7-mm RAMA x2. SPECIMENS REMOVED:   1. Total aspirate liposuction 400 mL. 2. Excision gynecomastia, right chest.   3. Excision gynecomastia left chest.    COMPLICATIONS: None. COUNTS: Correct x2. DISPOSITION: Stable to PACU.    BRIEF HISTORY: The patient is a 50-year-old male presenting for   elective gynecomastia excision and tumescent liposuction of the chest.   The overall procedure, incisional approach, expected outcomes, and   recovery were discussed. The risks, benefits, and alternatives to the   procedure including, but not limited to bleeding, infection, damage to   surrounding tissue structures, the need for revisional surgery, seroma,   hematoma, skin flap loss, fat necrosis, partial or total loss of sensation   to the nipple, hypertrophic scarring and asymmetry were discussed. PE, DVT, and fat embolism were discussed. The need for   postoperative drains and compression were discussed. He   understands these risks and agreed to proceed. DESCRIPTION OF PROCEDURE: Preoperative markings were made   with the patient in the standing position. Informed consent was   obtained. The patient was taken to the operating room and placed   supine on the operating table.  Sequential compression boots were placed. General endotracheal anesthesia was obtained. A lower body   Hector Hugger was placed. The chest was prepped and draped in the   usual sterile fashion. The liposuction port sites were marked and   injected with local anesthesia and made sharply. The right and left   prepectoral area that was then infused with 400 mL of standard   tumescent solution consisting of 1 L of warmed lactated Ringer's mixed   with 1 ampule of epinephrine and 10 mL of 2% lidocaine plain. A total   of 200 mL was injected into each prepectoral area using a Lamis   infiltrating cannula. After 7 minutes, tumescent liposuction was   performed using a 4.0 mm blunt Mercedes-tipped cannula in the deep   plane, followed by a 3.0 mm blunt Mercedes tip cannula in the   intermediate and superficial planes. Cross tunneling was performed at   all times and the adequacy of the resection was confirmed with the   deep palpation and pinch test. A total of 200 mL was removed from   each prepectoral area. The contour improvement appeared   satisfactory. Bilateral periareolar incisions were designed 3 cm in length bilaterally. The right incision was made sharply and dissection carried down   through the dermis and subcutaneous tissue to the anterior pectoralis   major muscle fascia. The subareolar disk was located and was excised   with electrocautery and was passed off the operating table for   permanent pathology. The right inframammary fold was then lowered   by raising an inferiorly-based fasciocutaneous flap with electrocautery   at the level of the anterior chest wall fascia. The fold was raised   inferiorly approximately 4 cm to allow skin redraping. The contour   improvement appeared satisfactory. The wound bed was then irrigated   with 2 L of triple antibiotic solution. Hemostasis was secured   throughout under direct vision using the lighted retractor.  A 7-mm flat   RAMA drain was brought out through the lateral liposuction port site and placed within the wound bed and secured to the skin with 3-0 nylon. The periareolar incision was closed with running 2-0 Monocryl on the   subcutaneous tissue, running deep dermal 3-0 Monocryl and a running   subcuticular 5-0 Monocryl on the skin. The liposuction port sites were   closed with interrupted 6-0 Prolene sutures. The same exact procedure   was repeated on the contralateral left side. Symmetry and volume   were satisfactory. The wounds were then dressed with Dermabond, 4 x   4's, Medipore tape. A compression vest was placed. Anesthesia was   then discontinued, the patient extubated in the operating room, having   tolerated the procedure well. The needle, instrument, and laparotomy   pad counts at the end of the case were correct.         MD Jon Johnson / Fay Brewer   D:  03/22/2017   12:48   T:  03/22/2017   15:52   Job #:  [id number]

## 2019-06-26 ENCOUNTER — DOCUMENTATION ONLY (OUTPATIENT)
Dept: SLEEP MEDICINE | Age: 29
End: 2019-06-26

## (undated) DEVICE — SUTURE VCRL SZ 3-0 L27IN ABSRB UD L26MM SH 1/2 CIR J416H

## (undated) DEVICE — ELECTRODE NDL 2.8IN COAT VALLEYLAB

## (undated) DEVICE — SUTURE MCRYL SZ 2-0 L27IN ABSRB UD SH L26MM TAPERPOINT NDL Y417H

## (undated) DEVICE — Z DISCONTINUED PER MEDLINE PACK PROCEDURE SURG PLAS

## (undated) DEVICE — SOLUTION LACTATED RINGERS INJECTION USP

## (undated) DEVICE — GAUZE SPONGES,12 PLY: Brand: CURITY

## (undated) DEVICE — INFECTION CONTROL KIT SYS

## (undated) DEVICE — SUTURE PERMAHAND SZ 2-0 L18IN NONABSORBABLE BLK L26MM SH C012D

## (undated) DEVICE — NEEDLE HYPO 22GA L1.5IN BLK S STL HUB POLYPR SHLD REG BVL

## (undated) DEVICE — SOLUTION IV 1000ML 0.9% SOD CHL

## (undated) DEVICE — REM POLYHESIVE ADULT PATIENT RETURN ELECTRODE: Brand: VALLEYLAB

## (undated) DEVICE — SUTURE VCRL SZ 2-0 L27IN ABSRB UD L26MM SH 1/2 CIR J417H

## (undated) DEVICE — SUT ETHLN 3-0 18IN PS1 BLK --

## (undated) DEVICE — SUTURE VCRL SZ 0 L27IN ABSRB UD SH L26MM 1/2 CIR TAPERPOINT J418H

## (undated) DEVICE — SURGICAL PROCEDURE PACK BASIN MAJ SET CUST NO CAUT

## (undated) DEVICE — I.V. DRAIN SPONGES: Brand: DERMACEA

## (undated) DEVICE — 3000CC GUARDIAN II: Brand: GUARDIAN

## (undated) DEVICE — BULB SYRINGE, IRRIGATION WITH PROTECTIVE CAP, 60 CC, INDIVIDUALLY WRAPPED: Brand: DOVER

## (undated) DEVICE — SUTURE PROL SZ 6-0 L18IN NONABSORBABLE BLU P-3 L13MM 3/8 8695G

## (undated) DEVICE — Z CONVERTED USE 2271116 TUBING ASPIR 9 FT STRL ULTRA-LIMP

## (undated) DEVICE — TUBING SUCT L9FT FOR AUTOFUSE INFLTR SYS

## (undated) DEVICE — STERILE POLYISOPRENE POWDER-FREE SURGICAL GLOVES: Brand: PROTEXIS

## (undated) DEVICE — SUTURE MCRYL SZ 4-0 L27IN ABSRB UD L19MM PS-2 1/2 CIR PRIM Y426H

## (undated) DEVICE — (D)SYR 10ML 1/5ML GRAD NSAF -- PKGING CHANGE USE ITEM 338027

## (undated) DEVICE — KERLIX BANDAGE ROLL: Brand: KERLIX

## (undated) DEVICE — NDL CTR 10/20 DBL MAGS PK: Brand: CARDINAL HEALTH

## (undated) DEVICE — SUTURE MCRYL SZ 3-0 L27IN ABSRB UD L19MM PS-2 3/8 CIR PRIM Y427H

## (undated) DEVICE — GOWN,SIRUS,NONRNF,SETINSLV,2XL,18/CS: Brand: MEDLINE

## (undated) DEVICE — 3M™ BAIR HUGGER® UNDERBODY BLANKET, FULL ACCESS, 10 PER CASE 63500: Brand: BAIR HUGGER™

## (undated) DEVICE — TRAY PREP DRY W/ PREM GLV 2 APPL 6 SPNG 2 UNDPD 1 OVERWRAP

## (undated) DEVICE — MEDI-VAC YANK SUCT HNDL W/TPRD BULBOUS TIP: Brand: CARDINAL HEALTH

## (undated) DEVICE — DERMABOND SKIN ADH 0.7ML -- DERMABOND ADVANCED 12/BX

## (undated) DEVICE — SYR 50ML LR LCK 1ML GRAD NSAF --

## (undated) DEVICE — DEVON™ KNEE AND BODY STRAP 60" X 3" (1.5 M X 7.6 CM): Brand: DEVON

## (undated) DEVICE — TOWEL SURG W17XL27IN STD BLU COT NONFENESTRATED PREWASHED

## (undated) DEVICE — SKIN MARKER,REGULAR TIP WITH RULER AND LABELS: Brand: DEVON

## (undated) DEVICE — Device

## (undated) DEVICE — KENDALL SCD EXPRESS SLEEVES, KNEE LENGTH, MEDIUM: Brand: KENDALL SCD

## (undated) DEVICE — SPONGE LAP 18X18IN STRL -- 5/PK

## (undated) DEVICE — SUTURE PDS II SZ 2-0 L27IN ABSRB UD CT-1 L36MM 1/2 CIR Z259H

## (undated) DEVICE — 3M™ MEDIPORE™ H SOFT CLOTH TAPE SHORT ROLL TAPE 6INCHES X 2 YARDS 16 ROLLS/CASE 2866S: Brand: 3M™ MEDIPORE™

## (undated) DEVICE — DRAPE,CHEST,FENES,15X10,STERIL: Brand: MEDLINE

## (undated) DEVICE — PLASMABLADE PS210-030S 3.0S LOCK: Brand: PLASMABLADE™

## (undated) DEVICE — DRAPE,REIN 53X77,STERILE: Brand: MEDLINE

## (undated) DEVICE — STAPLER SKIN 35CT WD STRL DISP -- MULTIFIRE PREMIUM

## (undated) DEVICE — INTENDED FOR TISSUE SEPARATION, AND OTHER PROCEDURES THAT REQUIRE A SHARP SURGICAL BLADE TO PUNCTURE OR CUT.: Brand: BARD-PARKER ® CARBON RIB-BACK BLADES

## (undated) DEVICE — COTTON BALLS: Brand: DEROYAL

## (undated) DEVICE — DRAPE FLD WRM W44XL66IN C6L FOR INTRATEMP SYS THERMABASIN

## (undated) DEVICE — DRAIN SURG W7MMXL20CM SIL FULL PERF HUBLESS FLAT RADPQ STRP

## (undated) DEVICE — 3M™ COBAN™ NL STERILE NON-LATEX SELF-ADHERENT WRAP, 2084S, 4 IN X 5 YD (10 CM X 4,5 M), 18 ROLLS/CASE: Brand: 3M™ COBAN™